# Patient Record
Sex: FEMALE | URBAN - METROPOLITAN AREA
[De-identification: names, ages, dates, MRNs, and addresses within clinical notes are randomized per-mention and may not be internally consistent; named-entity substitution may affect disease eponyms.]

---

## 2019-07-11 ENCOUNTER — APPOINTMENT (RX ONLY)
Dept: URBAN - METROPOLITAN AREA CLINIC 115 | Facility: CLINIC | Age: 59
Setting detail: DERMATOLOGY
End: 2019-07-11

## 2019-07-11 DIAGNOSIS — Z41.9 ENCOUNTER FOR PROCEDURE FOR PURPOSES OTHER THAN REMEDYING HEALTH STATE, UNSPECIFIED: ICD-10-CM

## 2019-07-11 PROCEDURE — ? ADDITIONAL NOTES

## 2019-07-11 PROCEDURE — ? COSMETIC CONSULTATION - KYBELLA

## 2019-07-11 ASSESSMENT — LOCATION ZONE DERM: LOCATION ZONE: FACE

## 2019-07-11 ASSESSMENT — LOCATION SIMPLE DESCRIPTION DERM: LOCATION SIMPLE: SUBMENTAL CHIN

## 2019-07-11 ASSESSMENT — LOCATION DETAILED DESCRIPTION DERM: LOCATION DETAILED: SUBMENTAL CHIN

## 2019-07-11 NOTE — PROCEDURE: ADDITIONAL NOTES
Detail Level: Simple
Additional Notes: See aesthetic consult sheet. I recommended she discuss surgical options/cost with Dr Clemente Hinds. I cannot guarantee a certain number of sessions of Kybella, if she will still have skin laxity. We could do 2 treatments and see how she is progressing with that.